# Patient Record
Sex: FEMALE | Race: WHITE | Employment: PART TIME | URBAN - METROPOLITAN AREA
[De-identification: names, ages, dates, MRNs, and addresses within clinical notes are randomized per-mention and may not be internally consistent; named-entity substitution may affect disease eponyms.]

---

## 2018-02-27 ENCOUNTER — OFFICE VISIT (OUTPATIENT)
Dept: FAMILY MEDICINE CLINIC | Age: 23
End: 2018-02-27

## 2018-02-27 VITALS
SYSTOLIC BLOOD PRESSURE: 110 MMHG | DIASTOLIC BLOOD PRESSURE: 70 MMHG | TEMPERATURE: 98.1 F | HEIGHT: 65 IN | RESPIRATION RATE: 12 BRPM | HEART RATE: 83 BPM | OXYGEN SATURATION: 98 % | WEIGHT: 161.6 LBS | BODY MASS INDEX: 26.92 KG/M2

## 2018-02-27 DIAGNOSIS — Z00.00 HEALTHCARE MAINTENANCE: Primary | ICD-10-CM

## 2018-02-27 DIAGNOSIS — J06.9 UPPER RESPIRATORY TRACT INFECTION, UNSPECIFIED TYPE: ICD-10-CM

## 2018-02-27 DIAGNOSIS — Z00.00 HEALTHCARE MAINTENANCE: ICD-10-CM

## 2018-02-27 DIAGNOSIS — Z86.39 H/O THYROID DISEASE: ICD-10-CM

## 2018-02-27 DIAGNOSIS — T14.8XXA BRUISING: ICD-10-CM

## 2018-02-27 DIAGNOSIS — J45.990 EXERCISE-INDUCED ASTHMA: ICD-10-CM

## 2018-02-27 LAB
ANION GAP SERPL CALCULATED.3IONS-SCNC: 13 MMOL/L (ref 3–16)
APTT: 35.3 SEC (ref 24.1–34.9)
BASOPHILS ABSOLUTE: 0 K/UL (ref 0–0.2)
BASOPHILS RELATIVE PERCENT: 0.3 %
BUN BLDV-MCNC: 8 MG/DL (ref 7–20)
CALCIUM SERPL-MCNC: 9.4 MG/DL (ref 8.3–10.6)
CHLORIDE BLD-SCNC: 102 MMOL/L (ref 99–110)
CHOLESTEROL, TOTAL: 163 MG/DL (ref 0–199)
CO2: 27 MMOL/L (ref 21–32)
CREAT SERPL-MCNC: 0.6 MG/DL (ref 0.6–1.1)
EOSINOPHILS ABSOLUTE: 0.3 K/UL (ref 0–0.6)
EOSINOPHILS RELATIVE PERCENT: 3 %
GFR AFRICAN AMERICAN: >60
GFR NON-AFRICAN AMERICAN: >60
GLUCOSE BLD-MCNC: 84 MG/DL (ref 70–99)
HCT VFR BLD CALC: 38.7 % (ref 36–48)
HDLC SERPL-MCNC: 55 MG/DL (ref 40–60)
HEMOGLOBIN: 12.9 G/DL (ref 12–16)
INR BLD: 1.04 (ref 0.85–1.15)
LDL CHOLESTEROL CALCULATED: 86 MG/DL
LYMPHOCYTES ABSOLUTE: 1.6 K/UL (ref 1–5.1)
LYMPHOCYTES RELATIVE PERCENT: 14.2 %
MCH RBC QN AUTO: 30.1 PG (ref 26–34)
MCHC RBC AUTO-ENTMCNC: 33.5 G/DL (ref 31–36)
MCV RBC AUTO: 89.8 FL (ref 80–100)
MONOCYTES ABSOLUTE: 0.4 K/UL (ref 0–1.3)
MONOCYTES RELATIVE PERCENT: 3.7 %
NEUTROPHILS ABSOLUTE: 8.7 K/UL (ref 1.7–7.7)
NEUTROPHILS RELATIVE PERCENT: 78.8 %
PDW BLD-RTO: 13.6 % (ref 12.4–15.4)
PLATELET # BLD: 292 K/UL (ref 135–450)
PMV BLD AUTO: 9.4 FL (ref 5–10.5)
POTASSIUM SERPL-SCNC: 4.5 MMOL/L (ref 3.5–5.1)
PROTHROMBIN TIME: 11.7 SEC (ref 9.6–13)
RBC # BLD: 4.31 M/UL (ref 4–5.2)
SODIUM BLD-SCNC: 142 MMOL/L (ref 136–145)
TRIGL SERPL-MCNC: 108 MG/DL (ref 0–150)
TSH SERPL DL<=0.05 MIU/L-ACNC: 0.49 UIU/ML (ref 0.27–4.2)
VLDLC SERPL CALC-MCNC: 22 MG/DL
WBC # BLD: 11 K/UL (ref 4–11)

## 2018-02-27 PROCEDURE — 99385 PREV VISIT NEW AGE 18-39: CPT | Performed by: FAMILY MEDICINE

## 2018-02-27 RX ORDER — ALBUTEROL SULFATE 90 UG/1
2 AEROSOL, METERED RESPIRATORY (INHALATION) EVERY 4 HOURS PRN
Qty: 1 INHALER | Refills: 3 | Status: SHIPPED | OUTPATIENT
Start: 2018-02-27 | End: 2019-03-18 | Stop reason: SDUPTHER

## 2018-02-27 ASSESSMENT — PATIENT HEALTH QUESTIONNAIRE - PHQ9
SUM OF ALL RESPONSES TO PHQ9 QUESTIONS 1 & 2: 0
1. LITTLE INTEREST OR PLEASURE IN DOING THINGS: 0
SUM OF ALL RESPONSES TO PHQ QUESTIONS 1-9: 0
2. FEELING DOWN, DEPRESSED OR HOPELESS: 0

## 2018-02-27 NOTE — PROGRESS NOTES
- CBC WITH AUTO DIFFERENTIAL; Future  - APTT; Future  - Protime-INR; Future    4. Upper respiratory tract infection, unspecified type  Supportive care. Return precautions reviewed. 5. Healthcare maintenance  Declined chlamydia testing.   - LIPID PANEL; Future  - BASIC METABOLIC PANEL; Future      Discussed medications with patient, who voiced understanding of their use and indications. All questions answered.     Return in about 1 year (around 2/27/2019) for Physical.

## 2018-04-20 ENCOUNTER — OFFICE VISIT (OUTPATIENT)
Dept: FAMILY MEDICINE CLINIC | Age: 23
End: 2018-04-20

## 2018-04-20 VITALS
WEIGHT: 160.8 LBS | HEIGHT: 65 IN | DIASTOLIC BLOOD PRESSURE: 60 MMHG | BODY MASS INDEX: 26.79 KG/M2 | SYSTOLIC BLOOD PRESSURE: 100 MMHG

## 2018-04-20 DIAGNOSIS — S39.012A STRAIN OF LUMBAR PARASPINOUS MUSCLE, INITIAL ENCOUNTER: Primary | ICD-10-CM

## 2018-04-20 PROCEDURE — 99214 OFFICE O/P EST MOD 30 MIN: CPT | Performed by: NURSE PRACTITIONER

## 2018-04-20 RX ORDER — CYCLOBENZAPRINE HCL 5 MG
5 TABLET ORAL 2 TIMES DAILY PRN
Qty: 20 TABLET | Refills: 0 | Status: SHIPPED | OUTPATIENT
Start: 2018-04-20 | End: 2018-04-30

## 2018-04-20 RX ORDER — NAPROXEN 500 MG/1
500 TABLET ORAL 2 TIMES DAILY WITH MEALS
Qty: 60 TABLET | Refills: 0 | Status: SHIPPED | OUTPATIENT
Start: 2018-04-20 | End: 2019-03-05 | Stop reason: ALTCHOICE

## 2018-04-20 ASSESSMENT — ENCOUNTER SYMPTOMS
BOWEL INCONTINENCE: 0
BACK PAIN: 1
ABDOMINAL PAIN: 0

## 2018-11-06 ENCOUNTER — OFFICE VISIT (OUTPATIENT)
Dept: FAMILY MEDICINE CLINIC | Age: 23
End: 2018-11-06
Payer: COMMERCIAL

## 2018-11-06 VITALS
DIASTOLIC BLOOD PRESSURE: 78 MMHG | WEIGHT: 160.6 LBS | HEIGHT: 65 IN | HEART RATE: 97 BPM | OXYGEN SATURATION: 99 % | SYSTOLIC BLOOD PRESSURE: 118 MMHG | BODY MASS INDEX: 26.76 KG/M2

## 2018-11-06 DIAGNOSIS — R30.0 DYSURIA: Primary | ICD-10-CM

## 2018-11-06 LAB
BILIRUBIN, POC: NEGATIVE
BLOOD URINE, POC: NORMAL
CLARITY, POC: NORMAL
COLOR, POC: NORMAL
GLUCOSE URINE, POC: NEGATIVE
KETONES, POC: NEGATIVE
LEUKOCYTE EST, POC: NORMAL
NITRITE, POC: NEGATIVE
PH, POC: 6
PROTEIN, POC: NEGATIVE
SPECIFIC GRAVITY, POC: 1.02
UROBILINOGEN, POC: NORMAL

## 2018-11-06 PROCEDURE — 81002 URINALYSIS NONAUTO W/O SCOPE: CPT | Performed by: NURSE PRACTITIONER

## 2018-11-06 PROCEDURE — 99212 OFFICE O/P EST SF 10 MIN: CPT | Performed by: NURSE PRACTITIONER

## 2018-11-06 RX ORDER — NITROFURANTOIN 25; 75 MG/1; MG/1
100 CAPSULE ORAL 2 TIMES DAILY
Qty: 14 CAPSULE | Refills: 0 | Status: SHIPPED | OUTPATIENT
Start: 2018-11-06 | End: 2018-11-13

## 2018-11-06 ASSESSMENT — ENCOUNTER SYMPTOMS
NAUSEA: 1
VOMITING: 0

## 2018-11-06 NOTE — PROGRESS NOTES
HPI:  11/6/2018    This is a 20 y. o.female   Chief Complaint   Patient presents with    Dysuria     Patient complains of dull lower back pain, lower abdominal discomfort, nausea and pressure with urination for the past 4-5 days. Urinary Tract Infection    This is a new problem. The current episode started in the past 7 days. The problem occurs every urination. The problem has been waxing and waning (yesterday was worst day). The quality of the pain is described as aching and burning. The pain is mild. There has been no fever. She is not sexually active. Associated symptoms include frequency, hesitancy, nausea and urgency. Pertinent negatives include no discharge, flank pain, hematuria or vomiting. She has tried increased fluids for the symptoms. The treatment provided mild relief. There is no history of kidney stones, recurrent UTIs or a urological procedure. /78 (Site: Left Upper Arm, Position: Sitting, Cuff Size: Medium Adult)   Pulse 97   Ht 5' 5\" (1.651 m)   Wt 160 lb 9.6 oz (72.8 kg)   LMP 10/27/2018   SpO2 99%   BMI 26.73 kg/m²     Allergies   Allergen Reactions    Latex Hives and Itching       Current Outpatient Prescriptions   Medication Sig Dispense Refill    nitrofurantoin, macrocrystal-monohydrate, (MACROBID) 100 MG capsule Take 1 capsule by mouth 2 times daily for 7 days 14 capsule 0    naproxen (NAPROSYN) 500 MG tablet Take 1 tablet by mouth 2 times daily (with meals) 60 tablet 0    albuterol sulfate HFA (PROAIR HFA) 108 (90 Base) MCG/ACT inhaler Inhale 2 puffs into the lungs every 4 hours as needed for Wheezing 1 Inhaler 3    ALBUTEROL IN Inhale into the lungs       No current facility-administered medications for this visit. Review of Systems   Gastrointestinal: Positive for nausea. Negative for vomiting. Genitourinary: Positive for frequency, hesitancy and urgency. Negative for flank pain and hematuria.        Physical Exam   Constitutional: She is oriented to person,

## 2018-11-08 LAB — URINE CULTURE, ROUTINE: NORMAL

## 2019-01-22 ENCOUNTER — OFFICE VISIT (OUTPATIENT)
Dept: FAMILY MEDICINE CLINIC | Age: 24
End: 2019-01-22
Payer: COMMERCIAL

## 2019-01-22 VITALS
WEIGHT: 157 LBS | SYSTOLIC BLOOD PRESSURE: 118 MMHG | DIASTOLIC BLOOD PRESSURE: 84 MMHG | HEIGHT: 65 IN | HEART RATE: 72 BPM | OXYGEN SATURATION: 98 % | BODY MASS INDEX: 26.16 KG/M2 | TEMPERATURE: 98 F

## 2019-01-22 DIAGNOSIS — J02.9 SORE THROAT: ICD-10-CM

## 2019-01-22 DIAGNOSIS — J06.9 VIRAL URI: Primary | ICD-10-CM

## 2019-01-22 PROCEDURE — 99212 OFFICE O/P EST SF 10 MIN: CPT | Performed by: NURSE PRACTITIONER

## 2019-01-22 PROCEDURE — 87880 STREP A ASSAY W/OPTIC: CPT | Performed by: NURSE PRACTITIONER

## 2019-01-22 RX ORDER — FLUTICASONE PROPIONATE 50 MCG
1 SPRAY, SUSPENSION (ML) NASAL DAILY
Qty: 1 BOTTLE | Refills: 0 | Status: SHIPPED | OUTPATIENT
Start: 2019-01-22

## 2019-01-22 ASSESSMENT — ENCOUNTER SYMPTOMS
CHANGE IN BOWEL HABIT: 0
SWOLLEN GLANDS: 1
VISUAL CHANGE: 0
COUGH: 0
SORE THROAT: 1
VOMITING: 0
NAUSEA: 0

## 2019-01-23 LAB — S PYO AG THROAT QL: NORMAL

## 2019-01-24 LAB — THROAT CULTURE: NORMAL

## 2019-03-05 ENCOUNTER — OFFICE VISIT (OUTPATIENT)
Dept: PRIMARY CARE CLINIC | Age: 24
End: 2019-03-05
Payer: COMMERCIAL

## 2019-03-05 VITALS
DIASTOLIC BLOOD PRESSURE: 80 MMHG | WEIGHT: 162.8 LBS | SYSTOLIC BLOOD PRESSURE: 120 MMHG | OXYGEN SATURATION: 98 % | HEART RATE: 70 BPM | BODY MASS INDEX: 27.09 KG/M2

## 2019-03-05 DIAGNOSIS — J01.90 ACUTE SINUSITIS, RECURRENCE NOT SPECIFIED, UNSPECIFIED LOCATION: ICD-10-CM

## 2019-03-05 DIAGNOSIS — G43.001 MIGRAINE WITHOUT AURA AND WITH STATUS MIGRAINOSUS, NOT INTRACTABLE: Primary | ICD-10-CM

## 2019-03-05 PROBLEM — E07.9 THYROID DISEASE: Status: RESOLVED | Noted: 2019-03-05 | Resolved: 2019-03-05

## 2019-03-05 PROCEDURE — 99213 OFFICE O/P EST LOW 20 MIN: CPT | Performed by: NURSE PRACTITIONER

## 2019-03-05 RX ORDER — SUMATRIPTAN 25 MG/1
25 TABLET, FILM COATED ORAL
Qty: 27 TABLET | Refills: 1 | Status: SHIPPED | OUTPATIENT
Start: 2019-03-05 | End: 2019-05-07 | Stop reason: SDUPTHER

## 2019-03-05 RX ORDER — NAPROXEN 250 MG/1
250 TABLET ORAL 2 TIMES DAILY WITH MEALS
COMMUNITY

## 2019-03-05 RX ORDER — IBUPROFEN 200 MG
400 TABLET ORAL EVERY 6 HOURS PRN
COMMUNITY

## 2019-03-05 RX ORDER — PROMETHAZINE HYDROCHLORIDE 25 MG/1
25 TABLET ORAL 3 TIMES DAILY PRN
Qty: 12 TABLET | Refills: 0 | Status: SHIPPED | OUTPATIENT
Start: 2019-03-05 | End: 2019-03-12

## 2019-03-05 RX ORDER — METHYLPREDNISOLONE 4 MG/1
TABLET ORAL
Qty: 1 KIT | Refills: 0 | Status: SHIPPED | OUTPATIENT
Start: 2019-03-05 | End: 2019-05-14 | Stop reason: ALTCHOICE

## 2019-03-05 ASSESSMENT — PATIENT HEALTH QUESTIONNAIRE - PHQ9
SUM OF ALL RESPONSES TO PHQ QUESTIONS 1-9: 0
SUM OF ALL RESPONSES TO PHQ QUESTIONS 1-9: 0
1. LITTLE INTEREST OR PLEASURE IN DOING THINGS: 0
SUM OF ALL RESPONSES TO PHQ9 QUESTIONS 1 & 2: 0
2. FEELING DOWN, DEPRESSED OR HOPELESS: 0

## 2019-03-06 ENCOUNTER — TELEPHONE (OUTPATIENT)
Dept: PRIMARY CARE CLINIC | Age: 24
End: 2019-03-06

## 2019-03-12 ASSESSMENT — ENCOUNTER SYMPTOMS
SCALP TENDERNESS: 0
BLURRED VISION: 0
SHORTNESS OF BREATH: 0
ABDOMINAL PAIN: 0
SORE THROAT: 0
PHOTOPHOBIA: 1
CONSTIPATION: 0
WHEEZING: 0
SINUS PRESSURE: 1
BACK PAIN: 0
COLOR CHANGE: 0
COUGH: 0
VISUAL CHANGE: 0
NAUSEA: 1
CHEST TIGHTNESS: 0
FACIAL SWELLING: 0
FACIAL SWEATING: 0
SINUS PAIN: 1
EYE PAIN: 0
RHINORRHEA: 0
DIARRHEA: 0
VOMITING: 0
SWOLLEN GLANDS: 0

## 2019-03-18 ENCOUNTER — TELEPHONE (OUTPATIENT)
Dept: PRIMARY CARE CLINIC | Age: 24
End: 2019-03-18

## 2019-03-18 DIAGNOSIS — J45.990 EXERCISE-INDUCED ASTHMA: ICD-10-CM

## 2019-03-18 RX ORDER — ALBUTEROL SULFATE 90 UG/1
2 AEROSOL, METERED RESPIRATORY (INHALATION) EVERY 4 HOURS PRN
Qty: 1 INHALER | Refills: 3 | Status: SHIPPED | OUTPATIENT
Start: 2019-03-18

## 2019-05-03 ENCOUNTER — TELEPHONE (OUTPATIENT)
Dept: PRIMARY CARE CLINIC | Age: 24
End: 2019-05-03

## 2019-05-03 RX ORDER — BUTALBITAL, ACETAMINOPHEN AND CAFFEINE 50; 325; 40 MG/1; MG/1; MG/1
1 TABLET ORAL
COMMUNITY
Start: 2019-03-07

## 2019-05-03 NOTE — TELEPHONE ENCOUNTER
Pt was seen in an appointment in march for migraines and would like to know if she should still be using the imitrex or if she should be using a different medication. She is currently out of the imitrex. Pt is getting the migraines 1-2 times a mth mostly around her menstrual cycle. She mentioned that maybe you had said in the ov that taking imitrex should not be long term so she didn't know if she should have something else. She also mentioned that she did go to the ER like we suggested last and they gave her fluids which really helped her a lot.

## 2019-05-07 ENCOUNTER — OFFICE VISIT (OUTPATIENT)
Dept: PRIMARY CARE CLINIC | Age: 24
End: 2019-05-07
Payer: COMMERCIAL

## 2019-05-07 VITALS
HEART RATE: 84 BPM | WEIGHT: 165.4 LBS | DIASTOLIC BLOOD PRESSURE: 78 MMHG | SYSTOLIC BLOOD PRESSURE: 108 MMHG | BODY MASS INDEX: 27.52 KG/M2 | OXYGEN SATURATION: 98 %

## 2019-05-07 DIAGNOSIS — G43.019 INTRACTABLE MIGRAINE WITHOUT AURA AND WITHOUT STATUS MIGRAINOSUS: Primary | ICD-10-CM

## 2019-05-07 DIAGNOSIS — N93.9 ABNORMAL UTERINE BLEEDING (AUB): ICD-10-CM

## 2019-05-07 LAB
BASOPHILS ABSOLUTE: 0.1 K/UL (ref 0–0.2)
BASOPHILS RELATIVE PERCENT: 0.9 %
EOSINOPHILS ABSOLUTE: 0.3 K/UL (ref 0–0.6)
EOSINOPHILS RELATIVE PERCENT: 3.8 %
HCT VFR BLD CALC: 40.2 % (ref 36–48)
HEMOGLOBIN: 13.7 G/DL (ref 12–16)
LYMPHOCYTES ABSOLUTE: 2.1 K/UL (ref 1–5.1)
LYMPHOCYTES RELATIVE PERCENT: 31.8 %
MCH RBC QN AUTO: 31.4 PG (ref 26–34)
MCHC RBC AUTO-ENTMCNC: 34.1 G/DL (ref 31–36)
MCV RBC AUTO: 92.3 FL (ref 80–100)
MONOCYTES ABSOLUTE: 0.4 K/UL (ref 0–1.3)
MONOCYTES RELATIVE PERCENT: 5.7 %
NEUTROPHILS ABSOLUTE: 3.9 K/UL (ref 1.7–7.7)
NEUTROPHILS RELATIVE PERCENT: 57.8 %
PDW BLD-RTO: 13.3 % (ref 12.4–15.4)
PLATELET # BLD: 393 K/UL (ref 135–450)
PMV BLD AUTO: 9.1 FL (ref 5–10.5)
RBC # BLD: 4.36 M/UL (ref 4–5.2)
WBC # BLD: 6.8 K/UL (ref 4–11)

## 2019-05-07 PROCEDURE — 99213 OFFICE O/P EST LOW 20 MIN: CPT | Performed by: NURSE PRACTITIONER

## 2019-05-07 PROCEDURE — 96372 THER/PROPH/DIAG INJ SC/IM: CPT | Performed by: NURSE PRACTITIONER

## 2019-05-07 RX ORDER — PROMETHAZINE HYDROCHLORIDE 25 MG/1
25 TABLET ORAL 3 TIMES DAILY PRN
Qty: 30 TABLET | Refills: 0 | Status: SHIPPED | OUTPATIENT
Start: 2019-05-07 | End: 2019-05-17

## 2019-05-07 RX ORDER — KETOROLAC TROMETHAMINE 30 MG/ML
60 INJECTION, SOLUTION INTRAMUSCULAR; INTRAVENOUS ONCE
Status: COMPLETED | OUTPATIENT
Start: 2019-05-07 | End: 2019-05-07

## 2019-05-07 RX ORDER — SUMATRIPTAN 25 MG/1
25 TABLET, FILM COATED ORAL
Qty: 27 TABLET | Refills: 1 | Status: SHIPPED | OUTPATIENT
Start: 2019-05-07 | End: 2019-05-07

## 2019-05-07 RX ORDER — KETOROLAC TROMETHAMINE 30 MG/ML
60 INJECTION, SOLUTION INTRAMUSCULAR; INTRAVENOUS ONCE
Qty: 2 ML | Refills: 0 | Status: CANCELLED
Start: 2019-05-07 | End: 2019-05-07

## 2019-05-07 RX ORDER — SUMATRIPTAN 50 MG/1
50 TABLET, FILM COATED ORAL
Qty: 27 TABLET | Refills: 1 | Status: CANCELLED | OUTPATIENT
Start: 2019-05-07 | End: 2019-05-07

## 2019-05-07 RX ADMIN — KETOROLAC TROMETHAMINE 60 MG: 30 INJECTION, SOLUTION INTRAMUSCULAR; INTRAVENOUS at 15:51

## 2019-05-07 NOTE — PROGRESS NOTES
no  Family h/o headache problems? no  Use of meds that might worsen HAs? no  Exposure to carbon monoxide? no  Substance use: alcohol: occasional, rare, caffeine: occasional        Patient's past medical history, surgical history, family history,medications, and allergies were all reviewed and updated as appropriate today. Review of Systems   Constitutional: Negative for activity change, appetite change, diaphoresis and fatigue. HENT: Negative for congestion, dental problem, ear discharge, ear pain, postnasal drip, rhinorrhea, sinus pressure, sinus pain, sneezing, sore throat and tinnitus. Respiratory: Negative for cough, chest tightness, shortness of breath and wheezing. Cardiovascular: Negative for chest pain, palpitations and leg swelling. Gastrointestinal: Positive for nausea. Negative for abdominal pain, constipation, diarrhea and vomiting. Endocrine: Negative. Musculoskeletal: Negative for arthralgias, back pain, gait problem, joint swelling, myalgias, neck pain and neck stiffness. Skin: Negative for color change and rash. Allergic/Immunologic: Negative for environmental allergies and food allergies. Neurological: Positive for light-headedness and headaches. Negative for dizziness, tremors, facial asymmetry, speech difficulty, weakness and numbness. Hematological: Negative. Psychiatric/Behavioral: Negative. Negative for dysphoric mood and sleep disturbance. The patient is not nervous/anxious. Vitals:  Vitals:    05/07/19 1450   BP: 108/78   Site: Left Upper Arm   Position: Sitting   Cuff Size: Medium Adult   Pulse: 84   SpO2: 98%   Weight: 165 lb 6.4 oz (75 kg)          Physical Exam   Constitutional: She is oriented to person, place, and time. Vital signs are normal. She appears well-developed and well-nourished. She does not appear ill. No distress. HENT:   Head: Normocephalic.    Right Ear: Hearing, tympanic membrane, external ear and ear canal normal.   Left Ear: Hearing, tympanic membrane, external ear and ear canal normal.   Nose: Nose normal.   Mouth/Throat: Uvula is midline, oropharynx is clear and moist and mucous membranes are normal.   Eyes: Pupils are equal, round, and reactive to light. Conjunctivae, EOM and lids are normal.   Neck: Trachea normal, normal range of motion and full passive range of motion without pain. Neck supple. Normal carotid pulses present. Carotid bruit is not present. No thyroid mass present. Cardiovascular: Normal rate, regular rhythm, S1 normal, S2 normal, normal heart sounds and intact distal pulses. Exam reveals no gallop. No murmur heard. Pulmonary/Chest: Effort normal and breath sounds normal. She has no decreased breath sounds. She has no wheezes. She has no rhonchi. Abdominal: Soft. Normal appearance and bowel sounds are normal. She exhibits no distension. There is no tenderness. There is no rebound, no guarding and no CVA tenderness. Musculoskeletal: Normal range of motion. She exhibits no edema or tenderness. Lymphadenopathy:     She has no cervical adenopathy. She has no axillary adenopathy. Neurological: She is alert and oriented to person, place, and time. She has normal reflexes. She exhibits normal muscle tone. Coordination normal.   Skin: Skin is warm. Capillary refill takes less than 2 seconds. No rash noted. Psychiatric: She has a normal mood and affect. Her speech is normal and behavior is normal. Thought content normal.       Assessment/Plan:   Karen Morrison was seen today for migraine. Diagnoses and all orders for this visit:    Intractable migraine without aura and without status migrainosus  -     VITAMIN B12 & FOLATE  -     Vitamin D 25 Hydroxy  -     TSH without Reflex  -     Comprehensive Metabolic Panel  -     CBC Auto Differential  -     promethazine (PHENERGAN) 25 MG tablet; Take 1 tablet by mouth 3 times daily as needed for Nausea  -     SUMAtriptan (IMITREX) 25 MG tablet;  Take 1 tablet by mouth once as needed for Migraine May take an additional tablet within 2 hours as needed if migraine not improved  -     ketorolac (TORADOL) injection 60 mg    Return if symptoms worsen or fail to improve. Patient agrees to plan and expresses understanding.     Riley-Grade-Allee 18, APRN - NP

## 2019-05-08 LAB
A/G RATIO: 1.7 (ref 1.1–2.2)
ALBUMIN SERPL-MCNC: 4.8 G/DL (ref 3.4–5)
ALP BLD-CCNC: 42 U/L (ref 40–129)
ALT SERPL-CCNC: 10 U/L (ref 10–40)
ANION GAP SERPL CALCULATED.3IONS-SCNC: 13 MMOL/L (ref 3–16)
AST SERPL-CCNC: 15 U/L (ref 15–37)
BILIRUB SERPL-MCNC: 0.3 MG/DL (ref 0–1)
BUN BLDV-MCNC: 10 MG/DL (ref 7–20)
CALCIUM SERPL-MCNC: 9.9 MG/DL (ref 8.3–10.6)
CHLORIDE BLD-SCNC: 102 MMOL/L (ref 99–110)
CO2: 27 MMOL/L (ref 21–32)
CREAT SERPL-MCNC: 0.7 MG/DL (ref 0.6–1.1)
FOLATE: 9.93 NG/ML (ref 4.78–24.2)
GFR AFRICAN AMERICAN: >60
GFR NON-AFRICAN AMERICAN: >60
GLOBULIN: 2.9 G/DL
GLUCOSE BLD-MCNC: 79 MG/DL (ref 70–99)
POTASSIUM SERPL-SCNC: 4.9 MMOL/L (ref 3.5–5.1)
SODIUM BLD-SCNC: 142 MMOL/L (ref 136–145)
TOTAL PROTEIN: 7.7 G/DL (ref 6.4–8.2)
TSH SERPL DL<=0.05 MIU/L-ACNC: 1.7 UIU/ML (ref 0.27–4.2)
VITAMIN B-12: 415 PG/ML (ref 211–911)
VITAMIN D 25-HYDROXY: 39.9 NG/ML

## 2019-05-14 ASSESSMENT — ENCOUNTER SYMPTOMS
EYE PAIN: 0
EYE REDNESS: 0

## 2019-05-16 ASSESSMENT — ENCOUNTER SYMPTOMS
BACK PAIN: 0
SHORTNESS OF BREATH: 0
SINUS PRESSURE: 0
WHEEZING: 0
RHINORRHEA: 0
CONSTIPATION: 0
CHEST TIGHTNESS: 0
ABDOMINAL PAIN: 0
COLOR CHANGE: 0
SORE THROAT: 0
VOMITING: 0
NAUSEA: 1
SINUS PAIN: 0
COUGH: 0
DIARRHEA: 0

## 2019-06-06 ENCOUNTER — OFFICE VISIT (OUTPATIENT)
Dept: PRIMARY CARE CLINIC | Age: 24
End: 2019-06-06
Payer: COMMERCIAL

## 2019-06-06 VITALS
SYSTOLIC BLOOD PRESSURE: 114 MMHG | BODY MASS INDEX: 27.79 KG/M2 | WEIGHT: 167 LBS | OXYGEN SATURATION: 100 % | HEART RATE: 63 BPM | DIASTOLIC BLOOD PRESSURE: 80 MMHG

## 2019-06-06 DIAGNOSIS — F41.9 ANXIOUSNESS: ICD-10-CM

## 2019-06-06 DIAGNOSIS — G47.00 INSOMNIA, UNSPECIFIED TYPE: ICD-10-CM

## 2019-06-06 DIAGNOSIS — G43.019 INTRACTABLE MIGRAINE WITHOUT AURA AND WITHOUT STATUS MIGRAINOSUS: Primary | ICD-10-CM

## 2019-06-06 PROCEDURE — 96372 THER/PROPH/DIAG INJ SC/IM: CPT | Performed by: NURSE PRACTITIONER

## 2019-06-06 PROCEDURE — 99213 OFFICE O/P EST LOW 20 MIN: CPT | Performed by: NURSE PRACTITIONER

## 2019-06-06 RX ORDER — KETOROLAC TROMETHAMINE 30 MG/ML
60 INJECTION, SOLUTION INTRAMUSCULAR; INTRAVENOUS ONCE
Status: COMPLETED | OUTPATIENT
Start: 2019-06-06 | End: 2019-06-06

## 2019-06-06 RX ORDER — SUMATRIPTAN 25 MG/1
TABLET, FILM COATED ORAL
COMMUNITY
Start: 2019-05-07

## 2019-06-06 RX ORDER — HYDROXYZINE PAMOATE 25 MG/1
25 CAPSULE ORAL 3 TIMES DAILY PRN
Qty: 90 CAPSULE | Refills: 0 | Status: SHIPPED | OUTPATIENT
Start: 2019-06-06 | End: 2019-07-06

## 2019-06-06 RX ADMIN — KETOROLAC TROMETHAMINE 60 MG: 30 INJECTION, SOLUTION INTRAMUSCULAR; INTRAVENOUS at 11:20

## 2019-06-06 NOTE — PROGRESS NOTES
Patient: Braeden Miguel is a 20 y.o. female who presents today with the following Chief Complaint(s):   Chief Complaint   Patient presents with    Migraine     Pt states that the imtrex was working last time but not as much with this migraine. Headache: Patient complains of recurrent headaches. She does have a headache at this time, present for 2 days, reports pain 9/10. Reports they occur around her menstrual cycles. Recently just finished her menses and headache began on the last day. Patient was treated by me on 3/5 for 9 days of HA/migraine with Imitrex, no improvement so she was seen in ED and given fluids, compazine and benadryl via IV with resolution of that headache. Since has had continued 2-3 day migraines with menses and would like to consider a preventative treatment. Patient is attempting to achieve pregnancy therefore would like to avoid OCP at this time. At last visit refilled rx for Imitrex, which she states works some of the time for her migraines. She also reports getting about 5-6 \"regular headaches\" per month or more. She will take ibuprofen for these with good relief but has difficulty if they cluster together. Description of Headaches:  Location of pain: bilateral, temporal, mastoid, nuchal  Radiation of pain?:none  Character of pain:aching, pressure, throbbing and constant  Severity of pain: 7  Accompanying symptoms: nausea, photophobia, decreased social functioning  Prodromal sx?: nausea  Rapidity of onset: sudden  Typical duration of individual headache: 2 days  Are most headaches similar in presentation?  yes  Typical precipitants: menses    Temporal Pattern of Headaches:  Started having HAs a few years ago  Worst time of day: evening  Awaken from sleep?: no  Seasonal pattern?: no  'Clustering' of HAs over time? no  Overall pattern since problem began: unchanged    Degree of Functional Impairment: moderate    Current Use of Meds to Treat HA:  Abortive meds? acetaminophen, NSAIDs (ibuprofen and naproxen), aspirin/acetaminophen/caffeine, sumatriptan PO  Daily use? no  Prophylactic meds? None    Additional Relevant History:  History of head/neck trauma? no  History of head/neck surgery? no  Family h/o headache problems? no  Use of meds that might worsen HAs? no  Exposure to carbon monoxide? no  Substance use: alcohol: occasional, rare, caffeine: occasional      Patient's past medical history, surgical history, family history,medications, and allergies were all reviewed and updated as appropriate today. Review of Systems   Constitutional: Positive for activity change, appetite change and fatigue. Negative for diaphoresis, fever and unexpected weight change. HENT: Negative for congestion, ear pain, facial swelling, postnasal drip, rhinorrhea, sinus pressure, sinus pain, sneezing and sore throat. Eyes: Positive for photophobia. Negative for pain and visual disturbance. Respiratory: Negative for cough, chest tightness, shortness of breath and wheezing. Cardiovascular: Negative for chest pain, palpitations and leg swelling. Gastrointestinal: Positive for nausea. Negative for abdominal pain, constipation, diarrhea and vomiting. Musculoskeletal: Negative. Skin: Negative for color change and rash. Neurological: Negative for dizziness, facial asymmetry, weakness, light-headedness and headaches. Psychiatric/Behavioral: Negative for decreased concentration, dysphoric mood, self-injury, sleep disturbance and suicidal ideas. The patient is not nervous/anxious. Vitals:  Vitals:    06/06/19 1027   BP: 114/80   Site: Left Upper Arm   Position: Sitting   Cuff Size: Medium Adult   Pulse: 63   SpO2: 100%   Weight: 167 lb (75.8 kg)          Physical Exam   Constitutional: She is oriented to person, place, and time. She appears well-developed and well-nourished. She does not appear ill. No distress. HENT:   Head: Normocephalic.    Right Ear: Hearing, tympanic membrane, external ear and ear canal normal.   Left Ear: Hearing, tympanic membrane, external ear and ear canal normal.   Nose: Nose normal. No mucosal edema, rhinorrhea or sinus tenderness. Right sinus exhibits no maxillary sinus tenderness and no frontal sinus tenderness. Left sinus exhibits no maxillary sinus tenderness and no frontal sinus tenderness. Mouth/Throat: Uvula is midline, oropharynx is clear and moist and mucous membranes are normal.   Eyes: Pupils are equal, round, and reactive to light. Conjunctivae, EOM and lids are normal.   Neck: Trachea normal, normal range of motion and full passive range of motion without pain. Neck supple. No thyroid mass and no thyromegaly present. Cardiovascular: Normal rate, regular rhythm, S1 normal, S2 normal, normal heart sounds, intact distal pulses and normal pulses. Exam reveals no gallop. No murmur heard. Pulmonary/Chest: Effort normal and breath sounds normal. She has no decreased breath sounds. She has no wheezes. She has no rhonchi. Abdominal: Soft. Normal appearance and bowel sounds are normal. She exhibits no distension. There is no tenderness. There is no rebound and no guarding. Musculoskeletal: Normal range of motion. She exhibits no edema or tenderness. Lymphadenopathy:     She has no cervical adenopathy. She has no axillary adenopathy. Neurological: She is alert and oriented to person, place, and time. She has normal strength and normal reflexes. No sensory deficit. She exhibits normal muscle tone. Coordination normal.   Skin: Skin is warm. Capillary refill takes less than 2 seconds. No rash noted. Psychiatric: She has a normal mood and affect. Her speech is normal and behavior is normal. Judgment normal. Cognition and memory are normal.       Assessment/Plan:   Danae was seen today for migraine.     Diagnoses and all orders for this visit:    Intractable migraine without aura and without status migrainosus  -     ketorolac (TORADOL) injection 60 mg    Insomnia, unspecified type  -     hydrOXYzine (VISTARIL) 25 MG capsule; Take 1 capsule by mouth 3 times daily as needed for Itching    Anxiousness  -     hydrOXYzine (VISTARIL) 25 MG capsule; Take 1 capsule by mouth 3 times daily as needed for Itching      Return if symptoms worsen or fail to improve. Patient agrees to plan and expresses understanding.     Riley-Grade-[address]lee 18, APRN - NP

## 2019-06-10 ASSESSMENT — ENCOUNTER SYMPTOMS
VOMITING: 0
EYE PAIN: 0
SINUS PRESSURE: 0
RHINORRHEA: 0
WHEEZING: 0
DIARRHEA: 0
CONSTIPATION: 0
FACIAL SWELLING: 0
SINUS PAIN: 0
COLOR CHANGE: 0
PHOTOPHOBIA: 1
NAUSEA: 1
ABDOMINAL PAIN: 0
SORE THROAT: 0
COUGH: 0
SHORTNESS OF BREATH: 0
CHEST TIGHTNESS: 0

## 2020-09-29 ENCOUNTER — TELEPHONE (OUTPATIENT)
Dept: PHARMACY | Facility: CLINIC | Age: 25
End: 2020-09-29

## 2020-09-29 NOTE — TELEPHONE ENCOUNTER
CLINICAL PHARMACY CONSULT: ASTHMA INHALER REVIEW    Cherrie Peabody is a 30 y.o. female Identified as an Asthma care gap for [place]: high medication possession ratio of rescue to maintenance inhalers. Per [employer] claims Asthma medication ratio 0    Attempt made to contact pt. Left msg on home/mobile TAD requesting call back at [phone] or [phone] option 7. Will continue to attempt to contact pt as appropriate.     Kenya Clark, PharmD, [address], [facility]  [facility] Pharmacist  O: [phone]  Department, toll free: [phone], option 7

## 2020-09-30 NOTE — TELEPHONE ENCOUNTER
CLINICAL PHARMACY CONSULT: ASTHMA INHALER REVIEW    Ann-Marie Lagunas is a 30 y.o. female Identified as an Asthma care gap for [place]: high medication possession ratio of rescue to maintenance inhalers. Per [employer] Asthma medication ratio 0    Second attempt made to contact pt. Left msg on home/mobile TAD requesting call back at [phone] or [phone] option 7. Letter sent.     Mary Hale, PharmD, [facility], [address]  [facility] Pharmacist  O: [phone]  Department, toll free: [phone], option 7     CLINICAL PHARMACY CONSULT: MED RECONCILIATION/REVIEW ADDENDUM  For Pharmacy Admin Tracking Only  PHSO: Yes  Total # of Interventions Recommended: 0  - Maintenance Safety Lab Monitoring #: 1  Recommended intervention potential cost savings: 0  Total Interventions Accepted: 0  Time Spent (min): 5